# Patient Record
Sex: MALE | Race: OTHER | NOT HISPANIC OR LATINO
[De-identification: names, ages, dates, MRNs, and addresses within clinical notes are randomized per-mention and may not be internally consistent; named-entity substitution may affect disease eponyms.]

---

## 2017-03-22 ENCOUNTER — RX RENEWAL (OUTPATIENT)
Age: 22
End: 2017-03-22

## 2017-03-22 DIAGNOSIS — Z87.09 PERSONAL HISTORY OF OTHER DISEASES OF THE RESPIRATORY SYSTEM: ICD-10-CM

## 2017-08-22 ENCOUNTER — RX RENEWAL (OUTPATIENT)
Age: 22
End: 2017-08-22

## 2017-08-22 DIAGNOSIS — L70.9 ACNE, UNSPECIFIED: ICD-10-CM

## 2017-08-22 RX ORDER — BENZOYL PEROXIDE 10 %
10 GEL (GRAM) TOPICAL 3 TIMES DAILY
Qty: 1 | Refills: 3 | Status: ACTIVE | COMMUNITY
Start: 2017-08-22 | End: 1900-01-01

## 2020-12-15 PROBLEM — Z87.09 HISTORY OF ACUTE SINUSITIS: Status: RESOLVED | Noted: 2017-03-22 | Resolved: 2020-12-15

## 2023-09-19 ENCOUNTER — NON-APPOINTMENT (OUTPATIENT)
Age: 28
End: 2023-09-19

## 2023-11-26 ENCOUNTER — NON-APPOINTMENT (OUTPATIENT)
Age: 28
End: 2023-11-26

## 2023-11-26 DIAGNOSIS — Z20.828 CONTACT WITH AND (SUSPECTED) EXPOSURE TO OTHER VIRAL COMMUNICABLE DISEASES: ICD-10-CM

## 2024-03-14 ENCOUNTER — APPOINTMENT (OUTPATIENT)
Dept: PEDIATRIC ALLERGY IMMUNOLOGY | Facility: CLINIC | Age: 29
End: 2024-03-14
Payer: MEDICAID

## 2024-03-14 VITALS
DIASTOLIC BLOOD PRESSURE: 85 MMHG | HEART RATE: 70 BPM | OXYGEN SATURATION: 98 % | WEIGHT: 145.5 LBS | HEIGHT: 66 IN | BODY MASS INDEX: 23.38 KG/M2 | SYSTOLIC BLOOD PRESSURE: 135 MMHG

## 2024-03-14 VITALS
HEART RATE: 75 BPM | WEIGHT: 160 LBS | HEIGHT: 69 IN | DIASTOLIC BLOOD PRESSURE: 74 MMHG | SYSTOLIC BLOOD PRESSURE: 114 MMHG | BODY MASS INDEX: 23.7 KG/M2 | OXYGEN SATURATION: 98 %

## 2024-03-14 DIAGNOSIS — Z23 ENCOUNTER FOR IMMUNIZATION: ICD-10-CM

## 2024-03-14 DIAGNOSIS — R68.83 CHILLS (WITHOUT FEVER): ICD-10-CM

## 2024-03-14 DIAGNOSIS — M54.9 DORSALGIA, UNSPECIFIED: ICD-10-CM

## 2024-03-14 DIAGNOSIS — Z86.19 PERSONAL HISTORY OF OTHER INFECTIOUS AND PARASITIC DISEASES: ICD-10-CM

## 2024-03-14 PROCEDURE — 36415 COLL VENOUS BLD VENIPUNCTURE: CPT

## 2024-03-14 PROCEDURE — 99244 OFF/OP CNSLTJ NEW/EST MOD 40: CPT

## 2024-03-14 PROCEDURE — 99204 OFFICE O/P NEW MOD 45 MIN: CPT

## 2024-03-14 PROCEDURE — 90732 PPSV23 VACC 2 YRS+ SUBQ/IM: CPT

## 2024-03-14 RX ORDER — BALOXAVIR MARBOXIL 40 MG/1
1 X 40 TABLET, FILM COATED ORAL
Qty: 1 | Refills: 0 | Status: COMPLETED | COMMUNITY
Start: 2023-11-26 | End: 2024-03-14

## 2024-03-14 RX ORDER — AMOXICILLIN AND CLAVULANATE POTASSIUM 875; 125 MG/1; MG/1
875-125 TABLET, COATED ORAL
Qty: 28 | Refills: 0 | Status: COMPLETED | COMMUNITY
Start: 2017-03-22 | End: 2024-03-14

## 2024-03-15 NOTE — HISTORY OF PRESENT ILLNESS
[de-identified] : Gabriel is a 28 yr old male presenting with frequent infections for about 1 year, last month once/month. Pt has headaches, tired, chills,  feels heat, backache. Feb pt had sinusitis with green/yellow discharge put on antibiotics (doesn't remember which antibiotic) and steroids that he didn't take. Pt was also given a steroid nasal spray that he did use for 10 days. The oral antibiotics (Augmentin) seemed to work best. Pt was congested for 10 days.  Pt was seen by Dr. Pablo who ed blood that showed a slightly low IgG1 and poor pneumococcal antibody titers. Edilberto is otherwise well/

## 2024-03-15 NOTE — REVIEW OF SYSTEMS
[Fatigue] : fatigue [Headache] : headache [Nl] : Genitourinary [Immunizations are up to date] : Immunizations are up to date [Received Influenza Vaccine this Past Year] : Patient has received the Influenza vaccine this past year [COVID-19 Vaccination Complete] : COVID-19 vaccination complete [Fever] : no fever [Wgt Loss (___ Lbs)] : no recent weight loss [Decreased Appetite] : no decrease in appetite [FreeTextEntry3] : wears glasses [FreeTextEntry4] : sinusitis in Feb [FreeTextEntry7] : occasional constipation [FreeTextEntry8] : on occasion  [de-identified] : mild acnea [de-identified] : high CPK age 19 yrs normalized since then was on creatin at the time of incresaed CPK [de-identified] : low anti-pneumocccal antibody tieters, mild Igg1 deficiency [PCV 23 Given (if asthmatic)] : PCV 23 not given

## 2024-03-15 NOTE — PHYSICAL EXAM
[Alert] : alert [Well Nourished] : well nourished [No Acute Distress] : no acute distress [Healthy Appearance] : healthy appearance [Well Developed] : well developed [Normal Pupil & Iris Size/Symmetry] : normal pupil and iris size and symmetry [No Discharge] : no discharge [No Photophobia] : no photophobia [Sclera Not Icteric] : sclera not icteric [Normal TMs] : both tympanic membranes were normal [Normal Nasal Mucosa] : the nasal mucosa was normal [Normal Lips/Tongue] : the lips and tongue were normal [Normal Outer Ear/Nose] : the ears and nose were normal in appearance [Normal Tonsils] : normal tonsils [No Thrush] : no thrush [Pale mucosa] : pale mucosa [Supple] : the neck was supple [No Crackles] : no crackles [Normal Rate and Effort] : normal respiratory rhythm and effort [No Retractions] : no retractions [Bilateral Audible Breath Sounds] : bilateral audible breath sounds [Normal Rate] : heart rate was normal  [Normal S1, S2] : normal S1 and S2 [No murmur] : no murmur [Regular Rhythm] : with a regular rhythm [Soft] : abdomen soft [Not Tender] : non-tender [Not Distended] : not distended [No HSM] : no hepato-splenomegaly [Normal Cervical Lymph Nodes] : cervical [Skin Intact] : skin intact  [No Rash] : no rash [No Skin Lesions] : no skin lesions [No clubbing] : no clubbing [No Edema] : no edema [No Cyanosis] : no cyanosis [Normal Mood] : mood was normal [Normal Affect] : affect was normal [Alert, Awake, Oriented as Age-Appropriate] : alert, awake, oriented as age appropriate [No Motor Deficits] : the motor exam was normal

## 2024-03-15 NOTE — CONSULT LETTER
[Dear  ___] : Dear  [unfilled], [Consult Letter:] : I had the pleasure of evaluating your patient, [unfilled]. [Please see my note below.] : Please see my note below. [Consult Closing:] : Thank you very much for allowing me to participate in the care of this patient.  If you have any questions, please do not hesitate to contact me. [Sincerely,] : Sincerely, [FreeTextEntry3] : Arturo Novak MD  for Academic Affairs, Department of Pediatrics Chief, Division of Allergy/Immunology [facility]  Allen Guerrero Professor of Pediatrics, Professor of Molecular Medicine [employer] at [facility]

## 2024-03-15 NOTE — HISTORY OF PRESENT ILLNESS
[de-identified] : Gabriel is a 28 yr old male presenting with frequent infections for about 1 year, last month once/month. Pt has headaches, tired, chills,  feels heat, backache. Feb pt had sinusitis with green/yellow discharge put on antibiotics (doesn't remember which antibiotic) and steroids that he didn't take. Pt was also given a steroid nasal spray that he did use for 10 days. The oral antibiotics (Augmentin) seemed to work best. Pt was congested for 10 days.  Pt was seen by Dr. Pablo who ed blood that showed a slightly low IgG1 and poor pneumococcal antibody titers. Edilberto is otherwise well/

## 2024-03-15 NOTE — REASON FOR VISIT
[Initial Consultation] : an initial consultation for [FreeTextEntry2] : low anti-pneumococcal antibody titers

## 2024-03-15 NOTE — CONSULT LETTER
[Dear  ___] : Dear  [unfilled], [Consult Letter:] : I had the pleasure of evaluating your patient, [unfilled]. [Please see my note below.] : Please see my note below. [Consult Closing:] : Thank you very much for allowing me to participate in the care of this patient.  If you have any questions, please do not hesitate to contact me. [Sincerely,] : Sincerely, [FreeTextEntry3] : Arturo Novak MD  for Academic Affairs, Department of Pediatrics Chief, Division of Allergy/Immunology [facility]  Allen Guerrero Professor of Pediatrics, Professor of Molecular Medicine [employer] at [facility]

## 2024-03-15 NOTE — REVIEW OF SYSTEMS
[Fatigue] : fatigue [Headache] : headache [Nl] : Genitourinary [Immunizations are up to date] : Immunizations are up to date [Received Influenza Vaccine this Past Year] : Patient has received the Influenza vaccine this past year [COVID-19 Vaccination Complete] : COVID-19 vaccination complete [Fever] : no fever [Wgt Loss (___ Lbs)] : no recent weight loss [Decreased Appetite] : no decrease in appetite [FreeTextEntry3] : wears glasses [FreeTextEntry4] : sinusitis in Feb [FreeTextEntry7] : occasional constipation [FreeTextEntry8] : on occasion  [de-identified] : mild acnea [de-identified] : high CPK age 19 yrs normalized since then was on creatin at the time of incresaed CPK [de-identified] : low anti-pneumocccal antibody tieters, mild Igg1 deficiency [PCV 23 Given (if asthmatic)] : PCV 23 not given

## 2024-03-15 NOTE — PHYSICAL EXAM
[Alert] : alert [Well Nourished] : well nourished [No Acute Distress] : no acute distress [Healthy Appearance] : healthy appearance [Well Developed] : well developed [Normal Pupil & Iris Size/Symmetry] : normal pupil and iris size and symmetry [No Discharge] : no discharge [No Photophobia] : no photophobia [Sclera Not Icteric] : sclera not icteric [Normal Nasal Mucosa] : the nasal mucosa was normal [Normal TMs] : both tympanic membranes were normal [Normal Lips/Tongue] : the lips and tongue were normal [Normal Outer Ear/Nose] : the ears and nose were normal in appearance [Normal Tonsils] : normal tonsils [No Thrush] : no thrush [Pale mucosa] : pale mucosa [Supple] : the neck was supple [Normal Rate and Effort] : normal respiratory rhythm and effort [No Crackles] : no crackles [No Retractions] : no retractions [Bilateral Audible Breath Sounds] : bilateral audible breath sounds [Normal Rate] : heart rate was normal  [Normal S1, S2] : normal S1 and S2 [No murmur] : no murmur [Soft] : abdomen soft [Regular Rhythm] : with a regular rhythm [Not Tender] : non-tender [Not Distended] : not distended [No HSM] : no hepato-splenomegaly [Normal Cervical Lymph Nodes] : cervical [Skin Intact] : skin intact  [No Rash] : no rash [No Skin Lesions] : no skin lesions [No clubbing] : no clubbing [No Edema] : no edema [No Cyanosis] : no cyanosis [Normal Mood] : mood was normal [Normal Affect] : affect was normal [Alert, Awake, Oriented as Age-Appropriate] : alert, awake, oriented as age appropriate [No Motor Deficits] : the motor exam was normal

## 2024-04-11 ENCOUNTER — APPOINTMENT (OUTPATIENT)
Dept: PEDIATRIC ALLERGY IMMUNOLOGY | Facility: CLINIC | Age: 29
End: 2024-04-11
Payer: MEDICAID

## 2024-04-11 DIAGNOSIS — J01.80 OTHER ACUTE SINUSITIS: ICD-10-CM

## 2024-04-11 DIAGNOSIS — R53.83 OTHER FATIGUE: ICD-10-CM

## 2024-04-11 DIAGNOSIS — R74.8 ABNORMAL LEVELS OF OTHER SERUM ENZYMES: ICD-10-CM

## 2024-04-11 DIAGNOSIS — D80.6 ANTIBODY DEFICIENCY WITH NEAR-NORMAL IMMUNOGLOBULINS OR WITH HYPERIMMUNOGLOBULINEMIA: ICD-10-CM

## 2024-04-11 PROCEDURE — 36415 COLL VENOUS BLD VENIPUNCTURE: CPT

## 2024-04-11 PROCEDURE — 99443: CPT

## 2024-04-11 RX ORDER — AMOXICILLIN AND CLAVULANATE POTASSIUM 875; 125 MG/1; MG/1
875-125 TABLET, COATED ORAL
Qty: 30 | Refills: 5 | Status: ACTIVE | COMMUNITY
Start: 2024-04-11 | End: 1900-01-01

## 2024-04-11 NOTE — HISTORY OF PRESENT ILLNESS
[Home] : at home, [unfilled] , at the time of the visit. [Medical Office: ([facility])___] : at the medical office located in  [Mother] : mother [Verbal consent obtained from patient] : the patient, [unfilled] [de-identified] : Gabriel is a 28 yr old male presenting with frequent infections for about 1 year, last month once/month.   Interim history: Pt is developing a slight sore throat and is otherwise well. He is not having and lethargy at this time, fever, or other symptoms of infection. Mother wanted to review Edilberto's laboratory data. but also wanted to have her son have a follow-up test for post vaccine pneumococcal antibody expression.    Past history: Pt has headaches, tired, chills, feels heat, backache. Feb pt had sinusitis with green/yellow discharge put on antibiotics (doesn't remember which antibiotic) and steroids that he didn't take. Pt was also given a steroid nasal spray that he did use for 10 days. The oral antibiotics (Augmentin) seemed to work best. Pt was congested for 10 days.  Pt was seen by Dr. Pablo who ed blood that showed a slightly low IgG1 and poor pneumococcal antibody titers. Edilberto is otherwise well/

## 2024-04-11 NOTE — REVIEW OF SYSTEMS
[Fatigue] : fatigue [Headache] : headache [Nl] : Genitourinary [Immunizations are up to date] : Immunizations are up to date [Received Influenza Vaccine this Past Year] : Patient has received the Influenza vaccine this past year [COVID-19 Vaccination Complete] : COVID-19 vaccination complete [Fever] : no fever [Wgt Loss (___ Lbs)] : no recent weight loss [Decreased Appetite] : no decrease in appetite [FreeTextEntry3] : wears glasses [FreeTextEntry4] : mild sore throat since yesterday [FreeTextEntry7] : occasional constipation [FreeTextEntry8] : on occasion  [de-identified] : mild acnea [de-identified] : high CPK age 19 yrs normalized since then was on creatin at the time of incresaed CPK [de-identified] : low anti-pneumocccal antibody tieters, mild Igg1 deficiency [PCV 23 Given (if asthmatic)] : PCV 23 not given

## 2024-04-16 LAB
CD16+CD56+ CELLS # BLD: 110 CELLS/UL
CD16+CD56+ CELLS NFR BLD: 8 %
CD19 CELLS NFR BLD: 255 CELLS/UL
CD3 CELLS # BLD: 1088 CELLS/UL
CD3 CELLS NFR BLD: 71 %
CD3+CD4+ CELLS # BLD: 645 CELLS/UL
CD3+CD4+ CELLS NFR BLD: 42 %
CD3+CD4+ CELLS/CD3+CD8+ CLL SPEC: 1.72 RATIO
CD3+CD8+ CELLS # SPEC: 376 CELLS/UL
CD3+CD8+ CELLS NFR BLD: 25 %
CELLS.CD3-CD19+/CELLS IN BLOOD: 18 %
CH50 SERPL-MCNC: 59 U/ML
COMPLEMENT, ALTERNATE PATHWAY (AH50): 85
LPT PW BLD-NRATE: NORMAL
MANNAN BINDING LECTIN (MBL): 1854 NG/ML

## 2024-05-11 LAB
DEPRECATED S PNEUM 1 IGG SER-MCNC: 0.3 MCG/ML
DEPRECATED S PNEUM 1 IGG SER-MCNC: 3 MCG/ML
DEPRECATED S PNEUM12 AB SER-ACNC: 0.2 MCG/ML
DEPRECATED S PNEUM12 AB SER-ACNC: 0.6 MCG/ML
DEPRECATED S PNEUM14 AB SER-ACNC: 0.7 MCG/ML
DEPRECATED S PNEUM14 AB SER-ACNC: 2.6 MCG/ML
DEPRECATED S PNEUM17 IGG SER IA-MCNC: 0.5 MCG/ML
DEPRECATED S PNEUM17 IGG SER IA-MCNC: 2 MCG/ML
DEPRECATED S PNEUM18 IGG SER IA-MCNC: 0.4 MCG/ML
DEPRECATED S PNEUM18 IGG SER IA-MCNC: 14.2 MCG/ML
DEPRECATED S PNEUM19 IGG SER-MCNC: 0.9 MCG/ML
DEPRECATED S PNEUM19 IGG SER-MCNC: 1.2 MCG/ML
DEPRECATED S PNEUM19 IGG SER-MCNC: 3.8 MCG/ML
DEPRECATED S PNEUM19 IGG SER-MCNC: 4 MCG/ML
DEPRECATED S PNEUM2 IGG SER-MCNC: 0.3 MCG/ML
DEPRECATED S PNEUM2 IGG SER-MCNC: 5 MCG/ML
DEPRECATED S PNEUM20 IGG SER-MCNC: 1.6 MCG/ML
DEPRECATED S PNEUM20 IGG SER-MCNC: 4.8 MCG/ML
DEPRECATED S PNEUM22 IGG SER-MCNC: 1 MCG/ML
DEPRECATED S PNEUM22 IGG SER-MCNC: 4.7 MCG/ML
DEPRECATED S PNEUM23 AB SER-ACNC: 0.4 MCG/ML
DEPRECATED S PNEUM23 AB SER-ACNC: 1.3 MCG/ML
DEPRECATED S PNEUM3 AB SER-ACNC: 0.2 MCG/ML
DEPRECATED S PNEUM3 AB SER-ACNC: 0.8 MCG/ML
DEPRECATED S PNEUM34 IGG SER-MCNC: 0.7 MCG/ML
DEPRECATED S PNEUM34 IGG SER-MCNC: 2.1 MCG/ML
DEPRECATED S PNEUM4 AB SER-ACNC: 0.2 MCG/ML
DEPRECATED S PNEUM4 AB SER-ACNC: 0.6 MCG/ML
DEPRECATED S PNEUM5 IGG SER-MCNC: 1.1 MCG/ML
DEPRECATED S PNEUM5 IGG SER-MCNC: 57.7 MCG/ML
DEPRECATED S PNEUM6 IGG SER-MCNC: 0.3 MCG/ML
DEPRECATED S PNEUM6 IGG SER-MCNC: 0.5 MCG/ML
DEPRECATED S PNEUM7 IGG SER-ACNC: 0.6 MCG/ML
DEPRECATED S PNEUM7 IGG SER-ACNC: 1.7 MCG/ML
DEPRECATED S PNEUM8 AB SER-ACNC: 0.7 MCG/ML
DEPRECATED S PNEUM8 AB SER-ACNC: 11.7 MCG/ML
DEPRECATED S PNEUM9 AB SER-ACNC: 0.4 MCG/ML
DEPRECATED S PNEUM9 AB SER-ACNC: 1.4 MCG/ML
DEPRECATED S PNEUM9 IGG SER-MCNC: 0.2 MCG/ML
DEPRECATED S PNEUM9 IGG SER-MCNC: 0.6 MCG/ML
IMMUNOLOGIST REVIEW: NORMAL
IMMUNOLOGIST REVIEW: NORMAL
SEROGROUP A: 17.6 UG/ML
SEROGROUP C: 0.6 UG/ML
SEROGROUP W135: <0.5 UG/ML
SEROGROUP Y: 1.3 UG/ML
STREPTOCOCCUS PNEUMONIAE SEROTYPE 11A: 0.2 MCG/ML
STREPTOCOCCUS PNEUMONIAE SEROTYPE 11A: NORMAL MCG/ML
STREPTOCOCCUS PNEUMONIAE SEROTYPE 15B: 1.1 MCG/ML
STREPTOCOCCUS PNEUMONIAE SEROTYPE 15B: 3.4 MCG/ML
STREPTOCOCCUS PNEUMONIAE SEROTYPE 33F: 1.6 MCG/ML
STREPTOCOCCUS PNEUMONIAE SEROTYPE 33F: 6.8 MCG/ML

## 2024-08-27 ENCOUNTER — APPOINTMENT (OUTPATIENT)
Dept: PEDIATRIC ALLERGY IMMUNOLOGY | Facility: CLINIC | Age: 29
End: 2024-08-27

## 2024-10-03 ENCOUNTER — NON-APPOINTMENT (OUTPATIENT)
Age: 29
End: 2024-10-03

## 2024-10-22 ENCOUNTER — APPOINTMENT (OUTPATIENT)
Dept: PEDIATRIC ALLERGY IMMUNOLOGY | Facility: CLINIC | Age: 29
End: 2024-10-22
Payer: MEDICAID

## 2024-10-22 VITALS
BODY MASS INDEX: 23.85 KG/M2 | DIASTOLIC BLOOD PRESSURE: 68 MMHG | SYSTOLIC BLOOD PRESSURE: 114 MMHG | HEART RATE: 83 BPM | OXYGEN SATURATION: 98 % | HEIGHT: 69 IN | WEIGHT: 161 LBS

## 2024-10-22 DIAGNOSIS — J34.89 NASAL CONGESTION: ICD-10-CM

## 2024-10-22 DIAGNOSIS — R09.81 NASAL CONGESTION: ICD-10-CM

## 2024-10-22 PROCEDURE — 99213 OFFICE O/P EST LOW 20 MIN: CPT

## 2024-10-23 ENCOUNTER — NON-APPOINTMENT (OUTPATIENT)
Age: 29
End: 2024-10-23

## 2024-10-23 LAB
RAPID RVP RESULT: NOT DETECTED
SARS-COV-2 RNA NPH QL NAA+NON-PROBE: NOT DETECTED

## 2025-04-08 ENCOUNTER — APPOINTMENT (OUTPATIENT)
Dept: PEDIATRIC ALLERGY IMMUNOLOGY | Facility: CLINIC | Age: 30
End: 2025-04-08
Payer: MEDICAID

## 2025-04-08 VITALS — HEART RATE: 73 BPM | OXYGEN SATURATION: 96 %

## 2025-04-08 DIAGNOSIS — Z86.19 PERSONAL HISTORY OF OTHER INFECTIOUS AND PARASITIC DISEASES: ICD-10-CM

## 2025-04-08 DIAGNOSIS — R09.81 NASAL CONGESTION: ICD-10-CM

## 2025-04-08 DIAGNOSIS — D80.6 ANTIBODY DEFICIENCY WITH NEAR-NORMAL IMMUNOGLOBULINS OR WITH HYPERIMMUNOGLOBULINEMIA: ICD-10-CM

## 2025-04-08 DIAGNOSIS — J30.1 ALLERGIC RHINITIS DUE TO POLLEN: ICD-10-CM

## 2025-04-08 DIAGNOSIS — J34.89 NASAL CONGESTION: ICD-10-CM

## 2025-04-08 PROCEDURE — 95004 PERQ TESTS W/ALRGNC XTRCS: CPT

## 2025-04-08 PROCEDURE — 99215 OFFICE O/P EST HI 40 MIN: CPT | Mod: 25

## 2025-04-08 RX ORDER — FLUTICASONE PROPIONATE 50 UG/1
50 SPRAY, METERED NASAL
Qty: 1 | Refills: 3 | Status: ACTIVE | COMMUNITY
Start: 2025-04-08 | End: 1900-01-01

## 2025-04-08 RX ORDER — CETIRIZINE HYDROCHLORIDE 10 MG/1
10 TABLET, FILM COATED ORAL
Qty: 1 | Refills: 3 | Status: ACTIVE | COMMUNITY
Start: 2025-04-08 | End: 1900-01-01

## 2025-04-09 ENCOUNTER — NON-APPOINTMENT (OUTPATIENT)
Age: 30
End: 2025-04-09

## 2025-04-09 LAB — IGE SER-MCNC: 14 KU/L

## 2025-04-10 ENCOUNTER — NON-APPOINTMENT (OUTPATIENT)
Age: 30
End: 2025-04-10

## 2025-04-10 LAB
DEPRECATED KAPPA LC FREE/LAMBDA SER: 0.92 RATIO
IGA SER QL IEP: 127 MG/DL
IGG SER QL IEP: 781 MG/DL
IGG SER QL IEP: 781 MG/DL
IGG1 SER-MCNC: 399 MG/DL
IGG2 SER-MCNC: 322 MG/DL
IGG3 SER-MCNC: 53.1 MG/DL
IGG4 SER-MCNC: 21.2 MG/DL
IGM SER QL IEP: 77 MG/DL
KAPPA LC CSF-MCNC: 0.92 MG/DL
KAPPA LC SERPL-MCNC: 0.85 MG/DL
MEV IGG FLD QL IA: 67.4 AU/ML
MEV IGG+IGM SER-IMP: POSITIVE
MUV AB SER-ACNC: POSITIVE
MUV IGG SER QL IA: 56.3 AU/ML
RUBV IGG FLD-ACNC: 9.41 INDEX
RUBV IGG SER-IMP: POSITIVE
VZV AB TITR SER: POSITIVE
VZV IGG SER IF-ACNC: 1.79 S/CO

## 2025-04-11 ENCOUNTER — NON-APPOINTMENT (OUTPATIENT)
Age: 30
End: 2025-04-11

## 2025-04-11 LAB — HAEM INFLU B AB SER-MCNC: >9 UG/ML

## 2025-04-14 ENCOUNTER — NON-APPOINTMENT (OUTPATIENT)
Age: 30
End: 2025-04-14

## 2025-04-14 LAB
C TETANI IGG SER-ACNC: 2.31 IU/ML
DEPRECATED S PNEUM 1 IGG SER-MCNC: 1.2 MCG/ML
DEPRECATED S PNEUM12 AB SER-ACNC: 0.9 MCG/ML
DEPRECATED S PNEUM14 AB SER-ACNC: 1.7 MCG/ML
DEPRECATED S PNEUM17 IGG SER IA-MCNC: 0.9 MCG/ML
DEPRECATED S PNEUM18 IGG SER IA-MCNC: 5.2 MCG/ML
DEPRECATED S PNEUM19 IGG SER-MCNC: 2.5 MCG/ML
DEPRECATED S PNEUM19 IGG SER-MCNC: 2.9 MCG/ML
DEPRECATED S PNEUM2 IGG SER-MCNC: 1.8 MCG/ML
DEPRECATED S PNEUM20 IGG SER-MCNC: 2.8 MCG/ML
DEPRECATED S PNEUM22 IGG SER-MCNC: 2.5 MCG/ML
DEPRECATED S PNEUM23 AB SER-ACNC: 0.7 MCG/ML
DEPRECATED S PNEUM3 AB SER-ACNC: 0.4 MCG/ML
DEPRECATED S PNEUM34 IGG SER-MCNC: 1.4 MCG/ML
DEPRECATED S PNEUM4 AB SER-ACNC: 0.4 MCG/ML
DEPRECATED S PNEUM5 IGG SER-MCNC: 25.2 MCG/ML
DEPRECATED S PNEUM6 IGG SER-MCNC: 0.4 MCG/ML
DEPRECATED S PNEUM7 IGG SER-ACNC: 1 MCG/ML
DEPRECATED S PNEUM8 AB SER-ACNC: 4.9 MCG/ML
DEPRECATED S PNEUM9 AB SER-ACNC: 0.8 MCG/ML
DEPRECATED S PNEUM9 IGG SER-MCNC: 0.3 MCG/ML
IMMUNOLOGIST REVIEW: NORMAL
STREPTOCOCCUS PNEUMONIAE SEROTYPE 11A: 0.9 MCG/ML
STREPTOCOCCUS PNEUMONIAE SEROTYPE 15B: 1.5 MCG/ML
STREPTOCOCCUS PNEUMONIAE SEROTYPE 33F: 3.5 MCG/ML

## 2025-04-17 ENCOUNTER — NON-APPOINTMENT (OUTPATIENT)
Age: 30
End: 2025-04-17

## 2025-04-17 LAB — C DIPHTHERIAE AB SER QL: >3 IU/ML

## 2025-04-18 ENCOUNTER — NON-APPOINTMENT (OUTPATIENT)
Age: 30
End: 2025-04-18

## 2025-04-18 LAB
POLIO 1 TITER BY  NEUTRALIZATION: NORMAL
POLIO 3 TITER BY  NEUTRALIZATION: NORMAL

## 2025-04-25 ENCOUNTER — OUTPATIENT (OUTPATIENT)
Dept: OUTPATIENT SERVICES | Facility: HOSPITAL | Age: 30
LOS: 1 days | End: 2025-04-25
Payer: MEDICAID

## 2025-04-25 ENCOUNTER — APPOINTMENT (OUTPATIENT)
Dept: CT IMAGING | Facility: CLINIC | Age: 30
End: 2025-04-25

## 2025-04-25 DIAGNOSIS — R09.81 NASAL CONGESTION: ICD-10-CM

## 2025-04-25 DIAGNOSIS — J01.80 OTHER ACUTE SINUSITIS: ICD-10-CM

## 2025-04-25 PROCEDURE — 70486 CT MAXILLOFACIAL W/O DYE: CPT | Mod: 26

## 2025-04-25 PROCEDURE — 70486 CT MAXILLOFACIAL W/O DYE: CPT

## 2025-05-06 ENCOUNTER — NON-APPOINTMENT (OUTPATIENT)
Age: 30
End: 2025-05-06

## 2025-05-08 ENCOUNTER — APPOINTMENT (OUTPATIENT)
Dept: OTOLARYNGOLOGY | Facility: CLINIC | Age: 30
End: 2025-05-08
Payer: MEDICAID

## 2025-05-08 DIAGNOSIS — J34.2 DEVIATED NASAL SEPTUM: ICD-10-CM

## 2025-05-08 DIAGNOSIS — R05.9 COUGH, UNSPECIFIED: ICD-10-CM

## 2025-05-08 DIAGNOSIS — J30.9 ALLERGIC RHINITIS, UNSPECIFIED: ICD-10-CM

## 2025-05-08 PROCEDURE — 99204 OFFICE O/P NEW MOD 45 MIN: CPT | Mod: 25

## 2025-05-08 PROCEDURE — 31231 NASAL ENDOSCOPY DX: CPT

## 2025-05-08 PROCEDURE — 99213 OFFICE O/P EST LOW 20 MIN: CPT | Mod: 25

## 2025-05-08 RX ORDER — BUDESONIDE 0.5 MG/2ML
0.5 INHALANT ORAL TWICE DAILY
Qty: 1 | Refills: 2 | Status: ACTIVE | COMMUNITY
Start: 2025-05-08 | End: 1900-01-01

## 2025-05-20 ENCOUNTER — NON-APPOINTMENT (OUTPATIENT)
Age: 30
End: 2025-05-20

## 2025-07-17 ENCOUNTER — APPOINTMENT (OUTPATIENT)
Dept: OTOLARYNGOLOGY | Facility: CLINIC | Age: 30
End: 2025-07-17
Payer: MEDICAID

## 2025-07-17 VITALS
DIASTOLIC BLOOD PRESSURE: 75 MMHG | TEMPERATURE: 98.2 F | HEIGHT: 69 IN | OXYGEN SATURATION: 97 % | BODY MASS INDEX: 23.85 KG/M2 | SYSTOLIC BLOOD PRESSURE: 118 MMHG | WEIGHT: 161 LBS | HEART RATE: 65 BPM

## 2025-07-17 PROCEDURE — 99213 OFFICE O/P EST LOW 20 MIN: CPT | Mod: 25

## 2025-07-17 PROCEDURE — 31231 NASAL ENDOSCOPY DX: CPT

## 2025-07-17 RX ORDER — BUDESONIDE 0.5 MG/2ML
0.5 INHALANT ORAL
Qty: 360 | Refills: 0 | Status: ACTIVE | COMMUNITY
Start: 2025-07-17 | End: 1900-01-01

## 2025-09-09 ENCOUNTER — NON-APPOINTMENT (OUTPATIENT)
Age: 30
End: 2025-09-09

## 2025-09-11 ENCOUNTER — APPOINTMENT (OUTPATIENT)
Dept: OTOLARYNGOLOGY | Facility: CLINIC | Age: 30
End: 2025-09-11
Payer: MEDICAID

## 2025-09-11 DIAGNOSIS — J34.2 DEVIATED NASAL SEPTUM: ICD-10-CM

## 2025-09-11 DIAGNOSIS — J30.9 ALLERGIC RHINITIS, UNSPECIFIED: ICD-10-CM

## 2025-09-11 PROCEDURE — 99213 OFFICE O/P EST LOW 20 MIN: CPT | Mod: 25

## 2025-09-11 PROCEDURE — 31231 NASAL ENDOSCOPY DX: CPT

## 2025-09-11 RX ORDER — AMOXICILLIN AND CLAVULANATE POTASSIUM 500; 125 MG/1; MG/1
500-125 TABLET, FILM COATED ORAL
Qty: 14 | Refills: 0 | Status: ACTIVE | COMMUNITY
Start: 2025-09-11 | End: 1900-01-01

## 2025-09-11 RX ORDER — MOMETASONE FUROATE 1 MG/ML
0.1 SOLUTION TOPICAL
Qty: 3 | Refills: 2 | Status: ACTIVE | COMMUNITY
Start: 2025-09-11 | End: 1900-01-01

## 2025-09-12 RX ORDER — AMOXICILLIN AND CLAVULANATE POTASSIUM 500; 125 MG/1; MG/1
500-125 TABLET, FILM COATED ORAL
Qty: 14 | Refills: 0 | Status: ACTIVE | COMMUNITY
Start: 2025-09-12 | End: 1900-01-01

## 2025-09-18 ENCOUNTER — APPOINTMENT (OUTPATIENT)
Dept: PEDIATRIC ALLERGY IMMUNOLOGY | Facility: CLINIC | Age: 30
End: 2025-09-18
Payer: MEDICAID

## 2025-09-18 VITALS
OXYGEN SATURATION: 97 % | HEART RATE: 85 BPM | WEIGHT: 165.38 LBS | SYSTOLIC BLOOD PRESSURE: 111 MMHG | HEIGHT: 69 IN | DIASTOLIC BLOOD PRESSURE: 62 MMHG | BODY MASS INDEX: 24.5 KG/M2

## 2025-09-18 DIAGNOSIS — J34.89 NASAL CONGESTION: ICD-10-CM

## 2025-09-18 DIAGNOSIS — R09.81 NASAL CONGESTION: ICD-10-CM

## 2025-09-18 DIAGNOSIS — J01.90 ACUTE SINUSITIS, UNSPECIFIED: ICD-10-CM

## 2025-09-18 DIAGNOSIS — D80.6 ANTIBODY DEFICIENCY WITH NEAR-NORMAL IMMUNOGLOBULINS OR WITH HYPERIMMUNOGLOBULINEMIA: ICD-10-CM

## 2025-09-18 DIAGNOSIS — J30.1 ALLERGIC RHINITIS DUE TO POLLEN: ICD-10-CM

## 2025-09-18 LAB — EAR NOSE AND THROAT CULTURE: ABNORMAL

## 2025-09-18 PROCEDURE — 99244 OFF/OP CNSLTJ NEW/EST MOD 40: CPT

## 2025-09-18 RX ORDER — AZELASTINE HYDROCHLORIDE 137 UG/1
0.1 SPRAY, METERED NASAL TWICE DAILY
Qty: 1 | Refills: 2 | Status: ACTIVE | COMMUNITY
Start: 2025-09-18 | End: 1900-01-01

## 2025-09-19 LAB
25(OH)D3 SERPL-MCNC: 37 NG/ML
ALBUMIN SERPL ELPH-MCNC: 4.6 G/DL
ALP BLD-CCNC: 62 U/L
ALT SERPL-CCNC: 20 U/L
ANION GAP SERPL CALC-SCNC: 13 MMOL/L
AST SERPL-CCNC: 19 U/L
BASOPHILS # BLD AUTO: 0.04 K/UL
BASOPHILS NFR BLD AUTO: 0.7 %
BILIRUB SERPL-MCNC: 0.6 MG/DL
BUN SERPL-MCNC: 16 MG/DL
CALCIUM SERPL-MCNC: 10 MG/DL
CHLORIDE SERPL-SCNC: 104 MMOL/L
CO2 SERPL-SCNC: 26 MMOL/L
CREAT SERPL-MCNC: 0.83 MG/DL
EGFRCR SERPLBLD CKD-EPI 2021: 122 ML/MIN/1.73M2
EOSINOPHIL # BLD AUTO: 0.08 K/UL
EOSINOPHIL NFR BLD AUTO: 1.3 %
ERYTHROCYTE [SEDIMENTATION RATE] IN BLOOD BY WESTERGREN METHOD: 3 MM/HR
GLUCOSE SERPL-MCNC: 90 MG/DL
HCT VFR BLD CALC: 44.6 %
HGB BLD-MCNC: 14.8 G/DL
IMM GRANULOCYTES NFR BLD AUTO: 0.2 %
LYMPHOCYTES # BLD AUTO: 1.51 K/UL
LYMPHOCYTES NFR BLD AUTO: 24.8 %
MAN DIFF?: NORMAL
MCHC RBC-ENTMCNC: 29.1 PG
MCHC RBC-ENTMCNC: 33.2 G/DL
MCV RBC AUTO: 87.6 FL
MONOCYTES # BLD AUTO: 0.5 K/UL
MONOCYTES NFR BLD AUTO: 8.2 %
NEUTROPHILS # BLD AUTO: 3.96 K/UL
NEUTROPHILS NFR BLD AUTO: 64.8 %
PLATELET # BLD AUTO: 231 K/UL
POTASSIUM SERPL-SCNC: 4.4 MMOL/L
PROT SERPL-MCNC: 6.7 G/DL
RBC # BLD: 5.09 M/UL
RBC # FLD: 11.8 %
SODIUM SERPL-SCNC: 143 MMOL/L
WBC # FLD AUTO: 6.1 K/UL

## 2025-09-21 PROBLEM — J01.90 ACUTE SINUSITIS: Status: ACTIVE | Noted: 2025-09-07

## 2025-09-23 LAB
DEPRECATED S PNEUM 1 IGG SER-MCNC: 1.2 MCG/ML
DEPRECATED S PNEUM12 AB SER-ACNC: 0.5 MCG/ML
DEPRECATED S PNEUM14 AB SER-ACNC: 1.4 MCG/ML
DEPRECATED S PNEUM17 IGG SER IA-MCNC: 0.7 MCG/ML
DEPRECATED S PNEUM18 IGG SER IA-MCNC: 3.8 MCG/ML
DEPRECATED S PNEUM19 IGG SER-MCNC: 1.9 MCG/ML
DEPRECATED S PNEUM19 IGG SER-MCNC: 2 MCG/ML
DEPRECATED S PNEUM2 IGG SER-MCNC: 1.5 MCG/ML
DEPRECATED S PNEUM20 IGG SER-MCNC: 2 MCG/ML
DEPRECATED S PNEUM22 IGG SER-MCNC: 1.9 MCG/ML
DEPRECATED S PNEUM23 AB SER-ACNC: 0.7 MCG/ML
DEPRECATED S PNEUM3 AB SER-ACNC: 0.3 MCG/ML
DEPRECATED S PNEUM34 IGG SER-MCNC: 1.2 MCG/ML
DEPRECATED S PNEUM4 AB SER-ACNC: 0.3 MCG/ML
DEPRECATED S PNEUM5 IGG SER-MCNC: 19.3 MCG/ML
DEPRECATED S PNEUM6 IGG SER-MCNC: 0.2 MCG/ML
DEPRECATED S PNEUM7 IGG SER-ACNC: 0.8 MCG/ML
DEPRECATED S PNEUM8 AB SER-ACNC: 4.2 MCG/ML
DEPRECATED S PNEUM9 AB SER-ACNC: 0.6 MCG/ML
DEPRECATED S PNEUM9 IGG SER-MCNC: 0.2 MCG/ML
IMMUNOLOGIST REVIEW: NORMAL
STREPTOCOCCUS PNEUMONIAE SEROTYPE 11A: 0.9 MCG/ML
STREPTOCOCCUS PNEUMONIAE SEROTYPE 15B: 1.1 MCG/ML
STREPTOCOCCUS PNEUMONIAE SEROTYPE 33F: 2.7 MCG/ML